# Patient Record
Sex: FEMALE | Race: WHITE
[De-identification: names, ages, dates, MRNs, and addresses within clinical notes are randomized per-mention and may not be internally consistent; named-entity substitution may affect disease eponyms.]

---

## 2021-07-19 NOTE — PCM.PNLD
Labor Progress Note





- VS & Meds


Vital Signs: 


                                Last Vital Signs











Temp  36.7 C   07/19/21 12:09


 


Pulse  86   07/19/21 12:09


 


Resp  16   07/19/21 12:09


 


BP  145/88 H  07/19/21 12:09


 


Pulse Ox  98   07/19/21 12:09











Active Medications: 


                               Current Medications





Acetaminophen (Acetaminophen 325 Mg Tab)  650 mg PO Q6H PRN


   PRN Reason: Pain (Mild 1-3) and fever


Diphenhydramine HCl (Diphenhydramine 50 Mg/Ml Sdv)  25 mg IVPUSH Q6H PRN


   PRN Reason: pruritis


Ephedrine Sulfate (Ephedrine 50 Mg/Ml Sdv)  5 mg IVPUSH ASDIRECTED PRN


   PRN Reason: Hypotension


Fentanyl (Fentanyl 100 Mcg/2 Ml Sdv)  100 mcg EPIDUR Q3H PRN


   PRN Reason: Pain


Fentanyl/Bupivacaine HCl (Bupivacaine/Fentanyl/Ns 100 Ml Bag)  100 ml EPIDUR 

ASDIRECTED PRN


   PRN Reason: Pain


Lactated Ringer's (Ringers, Lactated)  1,000 mls @ 100 mls/hr IV ASDIRECTED ROGER


   Last Admin: 07/19/21 17:24 Dose:  100 mls/hr


   Documented by: 


Oxytocin/Lactated Ringer's (Pitocin In Lr 10 Units/1,000 Ml)  10 unit in 1,000 

mls @ 100 mls/hr IV .CONTINUOUS ROGER


Oxytocin/Lactated Ringer's (Pitocin In Lr 10 Units/1,000 Ml)  10 unit in 1,000 

mls @ 12 mls/hr IV TITRATE ROGER; Protocol


   Last Admin: 07/19/21 17:38 Dose:  2 munits/min, 12 mls/hr


   Documented by: 


Misoprostol (Misoprostol 25 Mcg (1/4 Of 100 Mcg) Tab)  25 mcg VAG Q4H PRN


   PRN Reason: cervical ripening


   Last Admin: 07/19/21 13:29 Dose:  25 mcg


   Documented by: 


Nalbuphine HCl (Nalbuphine 10 Mg/1 Ml Vial)  10 mg IVPUSH Q2H PRN


   PRN Reason: Pain


Sodium Chloride (Sodium Chloride 0.9% 10 Ml Syringe)  10 ml FLUSH ASDIRECTED PRN


   PRN Reason: Keep Vein Open











- Uterine Contractions


Contraction Frequency (min): 3-7


Contraction Duration (sec): 60-75


Contraction Intensity: Mild to Moderate


Uterine Resting Tone: Soft





- Fetal Monitoring


Fetal Monitor Mode: Doppler/Auscultation


Fetal Heart Rate (FHR) Baseline: 125


Fetal Heart Rate (FHR) Variability: Moderate (6-25 bmp)


Fetal Accelerations: Present, 15x15


Fetal Decelerations: None


Fetal Strip Review: Category I





- Vaginal Exam


Dilation (cm): 3


Effacement (Percent): 80


Station: -2


Cervical Position: Midposition


Sterile Vaginal Exam Performed By: Nelson Jennings


Vaginal Exam Comment: The transcervical Flores bulb was able to be removed with 

gentle traction and cervical exam was performed after removal of the Flores bulb.





- Labor Progress (Free Text)


Labor Progress: 





Kelsey Jerry is a 25-year-old G1, P0 at 41 weeks 1 day undergoing induction 

of labor for late term pregnancy with new diagnosis of preeclampsia in pregnancy

given ongoing mild range blood pressures and elevated urine protein/creatinine 

ratio of 4.9








* Patient with previous transcervical Flores bulb in place that was able to be 

  removed at this time.  Cervix was 3/80/-2/soft/mid position after removal of 

  the Flores bulb.


* Given the dilation is fairly stable at 3 cm which is not significantly dilated

  at this time artificial rupture membranes was not performed


* Patient had received 1 dose of Cytotec with the placement of the Flores bulb


* Plan to transition to Pitocin with the cervix dilated to 3 cm after the Flores 

  bulb at this time


* Routine vitals at this time with close monitoring of her blood pressure for 

  any severe range blood pressures which may indicate severe features of 

  preeclampsia


* Patient denies any headache, vision changes or epigastric pain.  


* Patient has preeclampsia without severe features given the findings of ongoing

  mild range blood pressures with elevated urine protein/creatinine ratio of 

  4.9.  The remainder of her labs were within normal limits and were not showing

  any elevated values.


* Patient may have epidural if she desires


* Plan to recheck later this evening to see if she is having ongoing dilation of

  the cervix and we will plan for artificial rupture membranes at that time


* Anticipate vaginal delivery unless otherwise indicated








Nelson Jennings MD


5:49 PM


7/18/2021

## 2021-07-19 NOTE — PCM.PNLD
Labor Progress Note





- VS & Meds


Vital Signs: 


                                Last Vital Signs











Temp  36.7 C   07/19/21 12:09


 


Pulse  86   07/19/21 12:09


 


Resp  16   07/19/21 12:09


 


BP  145/88 H  07/19/21 12:09


 


Pulse Ox  98   07/19/21 12:09











Active Medications: 


                               Current Medications





Acetaminophen (Acetaminophen 325 Mg Tab)  650 mg PO Q6H PRN


   PRN Reason: Pain (Mild 1-3) and fever


Diphenhydramine HCl (Diphenhydramine 50 Mg/Ml Sdv)  25 mg IVPUSH Q6H PRN


   PRN Reason: pruritis


Ephedrine Sulfate (Ephedrine 50 Mg/Ml Sdv)  5 mg IVPUSH ASDIRECTED PRN


   PRN Reason: Hypotension


Fentanyl (Fentanyl 100 Mcg/2 Ml Sdv)  100 mcg EPIDUR Q3H PRN


   PRN Reason: Pain


Fentanyl/Bupivacaine HCl (Bupivacaine/Fentanyl/Ns 100 Ml Bag)  100 ml EPIDUR 

ASDIRECTED PRN


   PRN Reason: Pain


Lactated Ringer's (Ringers, Lactated)  1,000 mls @ 100 mls/hr IV ASDIRECTED ROGER


   Last Admin: 07/19/21 17:24 Dose:  100 mls/hr


   Documented by: 


Oxytocin/Lactated Ringer's (Pitocin In Lr 10 Units/1,000 Ml)  10 unit in 1,000 

mls @ 100 mls/hr IV .CONTINUOUS ROGER


Oxytocin/Lactated Ringer's (Pitocin In Lr 10 Units/1,000 Ml)  10 unit in 1,000 

mls @ 12 mls/hr IV TITRATE ROGER; Protocol


   Last Titration: 07/19/21 18:20 Dose:  4 munits/min, 24 mls/hr


   Documented by: 


Misoprostol (Misoprostol 25 Mcg (1/4 Of 100 Mcg) Tab)  25 mcg VAG Q4H PRN


   PRN Reason: cervical ripening


   Last Admin: 07/19/21 13:29 Dose:  25 mcg


   Documented by: 


Nalbuphine HCl (Nalbuphine 10 Mg/1 Ml Vial)  10 mg IVPUSH Q2H PRN


   PRN Reason: Pain


Sodium Chloride (Sodium Chloride 0.9% 10 Ml Syringe)  10 ml FLUSH ASDIRECTED PRN


   PRN Reason: Keep Vein Open





Discontinued Medications





Magnesium Sulfate (Magnesium Sulfate In Water 4 Gm/50 Ml) Confirm Administered 

Dose 50 mls @ as directed .ROUTE .STK-MED ONE


   Stop: 07/19/21 21:29


Magnesium Sulfate (Magnesium Sulfate In Water 40 Gm/1000 Ml) Confirm 

Administered Dose 40 gm in 1,000 mls @ as directed .ROUTE .STK-MED ONE


   Stop: 07/19/21 21:29


Labetalol HCl (Labetalol 100 Mg/20 Ml Mdv) Confirm Administered Dose 100 mg 

.ROUTE .STK-MED ONE


   Stop: 07/19/21 21:22


Ondansetron HCl (Ondansetron 4 Mg/2 Ml Sdv) Confirm Administered Dose 4 mg 

.ROUTE .STK-MED ONE


   Stop: 07/19/21 21:49











- Uterine Contractions


Contraction Frequency (min): 2-4


Contraction Duration (sec): 60-75


Contraction Intensity: Moderate to Strong


Uterine Resting Tone: Soft





- Fetal Monitoring


Fetal Monitor Mode: Doppler/Auscultation


Fetal Heart Rate (FHR) Baseline: 120


Fetal Heart Rate (FHR) Variability: Moderate (6-25 bmp)


Fetal Accelerations: Present, 15x15


Fetal Decelerations: Early, Variable (intermittent variable decelerations with 

contractions)


Fetal Strip Review: Category II





- Vaginal Exam


Dilation (cm): 4


Effacement (Percent): 90


Station: -2


Cervical Position: Midposition


Sterile Vaginal Exam Performed By: Nelson Jennings


Vaginal Exam Comment: Artificial rupture membranes performed with return of 

moderate amount of clear fluid.  Mother and infant tolerated procedure without 

difficulty.





- Labor Progress (Free Text)


Labor Progress: 





Kelsey Jerry is a 25-year-old G1, P0 at 41 weeks 1 day undergoing induction 

of labor for late term pregnancy with new diagnosis of preeclampsia with severe 

features with new onset sustained severe range blood pressures with treatment 

with labetalol x2 doses








* Patient with artificial rupture membranes with return of moderate amount of 

  clear fluid.  Mother and infant tolerated procedure without difficulty


* Patient with sustained severe range blood pressures and was treated with 

  labetalol 20 mg IV and then 40 mg IV.


* Start on magnesium sulfate IV with a 4 g bolus then 2 g/h afterwards with plan

  to continue for 24 hours after delivery


* Continue with close monitoring of vitals vitals to ensure that she is not 

  having any severe range pressures which would indicate additional need for 

  medical therapy


* Patient denies any headache, vision changes or epigastric pain.  


* Continue Pitocin for augmentation of labor


* Patient may have epidural if she desires


* Anticipate vaginal delivery unless otherwise indicated








Nelson Jennings MD


10:06 PM


7/18/2021

## 2021-07-19 NOTE — PCM.PREANE
Preanesthetic Assessment





- Procedure


Proposed Procedure: 





Labor epidural





- Anesthesia/Transfusion/Family Hx


Anesthesia History: Prior Anesthesia Without Reaction


Family History of Anesthesia Reaction: No


Transfusion History: No Prior Transfusion(s)


Intubation History: Unknown





- Review of Systems


General: No Symptoms


Pulmonary: No Symptoms


Cardiovascular: Edema (Lower extremities)


Gastrointestinal: Abdominal Pain (uterine contractions)


Neurological: Tingling (Tingling to pointer, middle, and ring finger tips that 

occurred during pregnancy)





- Physical Assessment


NPO Status Date: 07/19/21


NPO Status Time: 14:10


Vital Signs: 





                                Last Vital Signs











Temp  98.0 F   07/19/21 12:09


 


Pulse  86   07/19/21 12:09


 


Resp  16   07/19/21 12:09


 


BP  145/88 H  07/19/21 12:09


 


Pulse Ox  98   07/19/21 12:09











Height: 1.68 m


Weight: 117.934 kg


ASA Class: 2


Mental Status: Alert & Oriented x3


Airway Class: Mallampati = 2


Dentition: Reports: Normal Dentition


Thyro-Mental Finger Breadths: 3


Mouth Opening Finger Breadths: 3


Lungs: Clear to Auscultation, Normal Respiratory Effort


Cardiovascular: Regular Rate, Regular Rhythm





- Lab


Values: 





                             Laboratory Last Values











WBC  9.06 K/mm3 (3.98-10.04)   07/19/21  13:19    


 


RBC  3.62 M/mm3 (3.98-5.22)  L  07/19/21  13:19    


 


Hgb  12.1 gm/dl (11.2-15.7)   07/19/21  13:19    


 


Hct  36.4 % (34.1-44.9)   07/19/21  13:19    


 


MCV  100.6 fl (79.4-94.8)  H  07/19/21  13:19    


 


MCH  33.4 pg (25.6-32.2)  H  07/19/21  13:19    


 


MCHC  33.2 g/dl (32.2-35.5)   07/19/21  13:19    


 


RDW Std Deviation  45.9 fL (36.4-46.3)   07/19/21  13:19    


 


Plt Count  234 K/mm3 (182-369)   07/19/21  13:19    


 


MPV  11.4 fl (9.4-12.3)   07/19/21  13:19    


 


Neut % (Auto)  65.6 % (34.0-71.1)   07/19/21  13:19    


 


Lymph % (Auto)  21.1 % (19.3-51.7)   07/19/21  13:19    


 


Mono % (Auto)  11.7 % (4.7-12.5)   07/19/21  13:19    


 


Eos % (Auto)  0.9  (0.7-5.8)   07/19/21  13:19    


 


Baso % (Auto)  0.3 % (0.1-1.2)   07/19/21  13:19    


 


Neut # (Auto)  5.94 K/mm3 (1.56-6.13)   07/19/21  13:19    


 


Lymph # (Auto)  1.91 K/mm3 (1.18-3.74)   07/19/21  13:19    


 


Mono # (Auto)  1.06 K/mm3 (0.24-0.36)  H  07/19/21  13:19    


 


Eos # (Auto)  0.08 K/mm3 (0.04-0.36)   07/19/21  13:19    


 


Baso # (Auto)  0.03 K/mm3 (0.01-0.08)   07/19/21  13:19    


 


Sodium  140 mEq/L (136-145)   07/19/21  13:19    


 


Potassium  4.5 mEq/L (3.5-5.1)   07/19/21  13:19    


 


Chloride  106 mEq/L ()   07/19/21  13:19    


 


Carbon Dioxide  25 mEq/L (21-32)   07/19/21  13:19    


 


Anion Gap  13.5  (5-15)   07/19/21  13:19    


 


BUN  10 mg/dL (7-18)   07/19/21  13:19    


 


Creatinine  0.9 mg/dL (0.55-1.02)   07/19/21  13:19    


 


Est Cr Clr Drug Dosing  88.68 mL/min  07/19/21  13:19    


 


Estimated GFR (MDRD)  > 60 mL/min (>60)   07/19/21  13:19    


 


BUN/Creatinine Ratio  11.1  (14-18)  L  07/19/21  13:19    


 


Glucose  74 mg/dL (70-99)   07/19/21  13:19    


 


Calcium  8.6 mg/dL (8.5-10.1)   07/19/21  13:19    


 


Total Bilirubin  0.3 mg/dL (0.2-1.0)   07/19/21  13:19    


 


AST  21 U/L (15-37)   07/19/21  13:19    


 


ALT  15 U/L (14-59)   07/19/21  13:19    


 


Alkaline Phosphatase  217 U/L ()  H  07/19/21  13:19    


 


Total Protein  5.9 g/dl (6.4-8.2)  L  07/19/21  13:19    


 


Albumin  2.2 g/dl (3.4-5.0)  L  07/19/21  13:19    


 


Globulin  3.7 gm/dL  07/19/21  13:19    


 


Albumin/Globulin Ratio  0.6  (1-2)  L  07/19/21  13:19    








Labs reviewed and okay to proceed





- Allergies


Allergies/Adverse Reactions: 


                                    Allergies











Allergy/AdvReac Type Severity Reaction Status Date / Time


 


Sulfa (Sulfonamide Allergy  Cannot Verified 12/13/16 13:46





Antibiotics)   Remember  














- Blood


Product(s) Available: None





- Acknowledgements


Anesthesia Type Planned: Epidural


Pt an Appropriate Candidate for the Planned Anesthesia: Yes


Alternatives and Risks of Anesthesia Discussed w Pt/Guardian: Yes


Pt/Guardian Understands and Agrees with Anesthesia Plan: Yes





PreAnesthesia Questionnaire





- Past Health History


Medical/Surgical History: Denies Medical/Surgical History


Gastrointestinal History: Reports: GERD


OB/GYN History: Reports: Pregnancy


Other Musculoskeletal History: History of shoulder contusion





- Past Surgical History


HEENT Surgical History: Reports: Oral Surgery, Other (See Below) ([place] teeth 

extraction)





- SUBSTANCE USE


Tobacco Use Status *Q: Never Tobacco User


Tobacco Use Within Last Twelve Months: No


Second Hand Smoke Exposure: No


Recreational Drug Use History: No





- HOME MEDS


Home Medications: 


                                    Home Meds





Acetaminophen/oxyCODONE [Percocet 325-5 MG] 1 tab PO Q6H PRN #10 tablet 12/13/16

[Rx]


Pnv No.95/Ferrous Fum/Folic AC [Prenatal Caplet] 1 tab-cap PO DAILY 07/19/21 

[History]











- CURRENT (IN HOUSE) MEDS


Current Meds: 





                               Current Medications





Acetaminophen (Acetaminophen 325 Mg Tab)  650 mg PO Q6H PRN


   PRN Reason: Pain (Mild 1-3) and fever


Lactated Ringer's (Ringers, Lactated)  1,000 mls @ 100 mls/hr IV ASDIRECTED ROGER


Oxytocin/Lactated Ringer's (Pitocin In Lr 10 Units/1,000 Ml)  10 unit in 1,000 

mls @ 100 mls/hr IV .CONTINUOUS ROGER


Misoprostol (Misoprostol 25 Mcg (1/4 Of 100 Mcg) Tab)  25 mcg VAG Q4H PRN


   PRN Reason: cervical ripening


   Last Admin: 07/19/21 13:29 Dose:  25 mcg


   Documented by: 


Nalbuphine HCl (Nalbuphine 10 Mg/1 Ml Vial)  10 mg IVPUSH Q2H PRN


   PRN Reason: Pain


Sodium Chloride (Sodium Chloride 0.9% 10 Ml Syringe)  10 ml FLUSH ASDIRECTED PRN


   PRN Reason: Keep Vein Open

## 2021-07-19 NOTE — PCM.LDHP
L&D History of Present Illness





- General


Date of Service: 21


Admit Problem/Dx: 


                   Patient Status Order with Admit Dx/Problem





21 13:00


Patient Status [ADT] Routine 








                           Admission Diagnosis/Problem











Admission Diagnosis/Problem    41 weeks gestation of pregnancy














Source of Information: Patient


History Limitations: Reports: No Limitations





- History of Present Illness


Introduction:: 





Kelsey Jerry is a 25-year-old G1, P0 female at 41 weeks 1 day (JAQUELIN 

2021) by a 5-week ultrasound who presents for induction of labor in the se

tting of late term pregnancy.  Patient had been checked in the office on her 

appointment on 2021 and had membrane stripping performed at that time 

without any significant contractions since having the membranes stripped.  She 

states that she had been having some intermittent low back pain that was not 

consistent or regular.  She denies any leaking of fluid or vaginal bleeding.  

Reports that she did have some mucus discharge.  She reports good fetal 

movement.


Location, Pregnancy: Reports: Lower back


Quality: Reports: Pressure, Throbbing


Severity: Mild


Associated Symptoms: Denies: vaginal bleeding, vaginal discharge, vaginal fluid


Present Illness Comments:: 





Kelsey Jerry is a 25-year-old G1, P0 female at 41 weeks 1 day (JAQUELIN 

2021) by a 5-week ultrasound who presents for induction of labor in the 

setting of late term pregnancy.  She has had routine prenatal care with Dr. Reed starting at 5 weeks gestational age.  She denies any complications 

during this pregnancy.  She received Tdap vaccine on 2021.  She received 

the influenza vaccine on 10/27/2020.  She has not had any concerns throughout 

the pregnancy.








Her pregnancy is complicated by:


* Obesity in pregnancy with BMI of 42








OB/GYN history


: Current pregnancy








Prenatal labs


Blood type: O+


Antibody screen: Negative


First trimester hematocrit/hemoglobin: 37.6%/12.8 on 2021


Platelets: 265 on 2021


Urine culture: Negative


Rubella status: Immune


Hepatitis B surface antigen: Negative


RPR: Negative


Hepatitis C: Negative


HIV: Negative


Gonorrhea: Negative


Chlamydia: Negative


One hour glucose tolerance test: 100


Second trimester hematocrit/hemoglobin: 37.0%/12.5 on 3/23/2021


Platelets: 287 on 3/23/2021


GBS status: Negative





- Related Data


Allergies/Adverse Reactions: 


                                    Allergies











Allergy/AdvReac Type Severity Reaction Status Date / Time


 


Sulfa (Sulfonamide Allergy  Cannot Verified 16 13:46





Antibiotics)   Remember  











Home Medications: 


                                    Home Meds





Acetaminophen/oxyCODONE [Percocet 325-5 MG] 1 tab PO Q6H PRN #10 tablet 16

[Rx]


Pnv No.95/Ferrous Fum/Folic AC [Prenatal Caplet] 1 tab-cap PO DAILY 21 

[History]











Past Medical History





- Past Health History


Medical/Surgical History: Denies Medical/Surgical History





- Past Surgical History


HEENT Surgical History: Reports: Oral Surgery, Other (See Below) ([place] teeth 

extraction)





Social & Family History





- Tobacco Use


Tobacco Use Status *Q: Never Tobacco User


Tobacco Use Within Last Twelve Months: No





- Tobacco Core Measures


Tobacco Use/Smoking Within Last 30 Days: No


Smokeless Tobacco Use in Last 30 Days: No





- Caffeine Use


Caffeine Use: Reports: None





- Alcohol Use


Alcohol Use History: No


Alcohol Use in Last Twelve Months: No





- Recreational Drug Use


Recreational Drug Use: No


Drug Use in Last 12 Months: No





- Living Situation & Occupation


Living situation: Reports: , with Spouse





H&P Review of Systems





- Review of Systems:


Review Of Systems: See Below


General: Denies: Fever, Chills, Malaise, Weakness, Fatigue


HEENT: Reports: Glasses.  Denies: Headaches, Rhinitis, Post Nasal Drip, Sinus 

Congestion, Sore Throat, Visual Changes


Pulmonary: Denies: Shortness of Breath, Wheezing, Pleuritic Chest Pain, Cough


Cardiovascular: Denies: Chest Pain, Palpitations, Dyspnea on Exertion, Orthopnea


Gastrointestinal: Denies: Abdominal Pain, Constipation, Diarrhea, Nausea, 

Vomiting


Genitourinary: Denies: Dysuria, Frequency, Burning, Pain, Urgency


Musculoskeletal: Reports: Back Pain


Skin: Denies: Rash, Lumps


Psychiatric: Denies: Depression, Anxiety





L&D Exam





- Exam


Exam: See Below





- Vital Signs


Vital Signs: 


                                Last Vital Signs











Temp  36.7 C   21 12:09


 


Pulse  86   21 12:09


 


Resp  16   21 12:09


 


BP  145/88 H  21 12:09


 


Pulse Ox  98   21 12:09











Weight: 117.934 kg





- OB Specific


Contraction Duration (sec): 60-75


Contraction Frequency (min): 4-7


Contraction Intensity: Mild


Fetal Movement: Active


Fetal Heart Tones: Present


Fetal Heart Tones per Min: 130 (+15 x 15 accelerations, no decelerations)


Fetal Heart Rate (FHR) Variability: Moderate (6-25 bmp)


Birth Presentation: Vertex


Estimated Fetal Weight: 8-8.5 pounds by Leopold





- Dave Score


Dave Score Cervix Position: Posterior


Dave Score Consistency: Soft


Dave Score Effacement: >80% (80%)


Dave Score Dilation: 1-2 cm (2 cm)


Dave Score Infant's Station: -3


Dave Score Total: 6





- Exam


General: Alert, Oriented


HEENT: Conjunctiva Clear, EOMI


Neck: Supple, Trachea Midline


Lungs: Clear to Auscultation, Normal Respiratory Effort


Cardiovascular: Regular Rate, Regular Rhythm


GI/Abdominal Exam: Soft, Non-Tender, No Distention, Other.  No: Guarding, Rigid,

 Rebound


Genitourinary: Normal external exam, Cervical dilitation (1 cm by visual 

inspection), Other (A 16 Swedish Flores catheter was placed using direct 

visualization with a speculum and the catheter was filled with 40 mL of sterile 

saline.  Mother and infant tolerated procedure without difficulty.)


Skin: Warm, Dry, Intact


Psychiatric: Alert, Normal Affect, Normal Mood





- Problem List


(1) 41 weeks gestation of pregnancy


SNOMED Code(s): 73449586


   ICD Code: Z3A.41 - 41 WEEKS GESTATION OF PREGNANCY   Status: Acute   Current 

Visit: Yes   





(2) Obesity affecting pregnancy


SNOMED Code(s): 712999519088, 245228722617


   ICD Code: O99.210 - OBESITY COMPLICATING PREGNANCY, UNSPECIFIED TRIMESTER   

Status: Acute   Current Visit: Yes   


Problem List Initiated/Reviewed/Updated: Yes


Orders Last 24hrs: 


                               Active Orders 24 hr











 Category Date Time Status


 


 Patient Status [ADT] Routine ADT  21 13:00 Ordered


 


 Activity as Tolerated [RC] PFP Care  21 13:00 Ordered


 


 Communication Order [RC] ASDIRECTED Care  21 13:00 Ordered


 


 Communication Order [RC] ASDIRECTED Care  21 13:00 Ordered


 


 Communication Order [RC] ASDIRECTED Care  21 13:00 Ordered


 


 Communication Order [RC] ASDIRECTED Care  21 13:00 Ordered


 


 Fetal Heart Tones [RC] ASDIRECTED Care  21 13:01 Ordered


 


 Fetal Monitoring [RC] INTERMITTENT Care  21 13:00 Ordered


 


 Fetal Non Stress Test [RC] PER UNIT ROUTINE Care  21 13:00 Ordered


 


 Notify Provider Vital Signs [RC] PRN Care  21 13:02 Ordered


 


 Notify Provider [RC] ASDIRECTED Care  21 13:00 Ordered


 


 Notify Provider [RC] ASDIRECTED Care  21 13:04 Ordered


 


 Notify Provider [RC] PFP Care  21 13:00 Ordered


 


 Notify Provider [RC] PRN Care  21 13:00 Ordered


 


 Peripheral IV Care [RC] .AS DIRECTED Care  21 13:01 Ordered


 


 Pump Management, Intrathecal [RC] ASDIRECTED Care  21 13:01 Ordered


 


 Urinary Catheter Assessment [RC] ASDIRECTED Care  21 12:59 Ordered


 


 Vaginal Exam [RC] ASDIRECTED Care  21 13:00 Ordered


 


 Vital Signs [RC] ASDIRECTED Care  21 13:00 Ordered


 


 Vital Signs [RC] PER UNIT ROUTINE Care  21 13:00 Ordered


 


 Regular Diet [DIET] Diet  21 Lunch Ordered


 


 CBC WITH AUTO DIFF [HEME] Routine Lab  21 12:59 Ordered


 


 COMPREHENSIVE METABOLIC PN,CMP [CHEM] Routine Lab  21 12:59 Ordered


 


 PROTEIN/CREATININE RATIO,URINE [URCHEM] Routine Lab  21 12:59 Ordered


 


 RAPID PLASMA REAGIN,RPR [CHEM] Routine Lab  21 13:00 Ordered


 


 TYPE AND SCREEN [BBK] Routine Lab  21 12:59 Ordered


 


 Acetaminophen [TylenoL] Med  21 12:59 Ordered





 650 mg PO Q6H PRN   


 


 Lactated Ringers [Ringers, Lactated] 1,000 ml Med  21 13:00 Ordered





 IV ASDIRECTED   


 


 Nalbuphine [Nubain] Med  21 12:59 Ordered





 10 mg IVPUSH Q2H PRN   


 


 Oxytocin/Lactated Ringers [Pitocin in LR 10 Units/1,000 Med  21 13:00 

Ordered





 ML]   





 10 unit in 1,000 ml IV .CONTINUOUS   


 


 Sodium Chloride 0.9% [Saline Flush] Med  21 12:59 Ordered





 10 ml FLUSH ASDIRECTED PRN   


 


 miSOPROStoL [Cytotec] Med  21 12:59 Ordered





 25 mcg VAG Q4H PRN   


 


 Electronic Fetal Heart Tones Ext w TOCO [WOMSER] Oth  21 13:00 Ordered





 Routine   


 


 Electronic Fetal Heart Tones Internal [WOMSER] Per Unit Oth  21 13:00 

Ordered





 Routine   


 


 Peripheral IV Insertion Adult [OM.PC] Routine Oth  21 13:00 Ordered


 


 Resuscitation Status Routine Resus Stat  21 12:59 Ordered











Assessment/Plan Comment:: 





Kelsey Jerry is a 25-year-old G1, P0 female at 41 weeks 1 day (JAQUELIN 

2021) undergoing induction of labor in the setting of late term pregnancy











* Refer to observation for elective induction of labor


* Patient had placement of a 16 Swedish Flores bulb catheter with direct 

  visualization and the Flores bulb was filled with 40 mL of sterile saline.  

  Mother and infant tolerated procedure without difficulty.


* Start induction of labor with Cytotec 25 mcg vaginally now and every 4 hours


* Intermittent monitoring while on Cytotec with monitoring for 30 minutes after 

  placement of Cytotec and may ambulate as tolerated with category 1 monitoring


* Place IV and have Lactated Ringer's at 125 ml/hr if not tolerating regular 

  diet


* May have regular diet while on Cytotec induction


* Activity as tolerated


* May have epidural as desired


* Plans to breast-feed after delivery


* Patient with 1 mild range blood pressure with a value of 145/88 and we will 

  get labs including CBC, CMP, LDH and urine protein/creatinine ratio.  We will 

  continue to monitor blood pressures closely throughout the induction to ensure

   that there is not any evidence of gestational hypertension or preeclampsia


* Anticipate vaginal delivery unless otherwise indicated








Nelson Jennings MD


1:17 PM


2021

## 2021-07-20 NOTE — PCM.SN.2
- Free Text/Narrative


Note: 





S- Feeling well. Some pain. No complaints. 


    No headaches or visual changes





O- t- 100.1, Tmax - 100.1. Blood pressures 128/85


    Fundus above umbilicus with bakri in place.


    Output from bakri 50 mL





A - Doing well, minimal output from bakri


     CBC minimal drop after significant hemorrhage and 2 uPRBC. Will recheck in 

morning





     Severe pre-e - magnesium discontinued during acute hemorrhage. Given risk 

of bleeding and elevated creatinine will leave magnesium off.


                          hold ibuprofen for now


                          UOP increasing and clearing. Watch output closely. 

Repeat labs in the am.

## 2021-07-21 NOTE — PCM.PNPP
- General Info


Date of Service: 21


Subjective Update: 





PPD1 after  on magnesium for pre-ecclampsia and postpartum hemorrhage with 

TXA, cytotec, bakri balloon and 2 units pRBCs. Up with assist from nurse over 

night and fairly tired and unsteady. 





Pain controlled. 


Functional Status: Reports: Pain Controlled





- Review of Systems


General: Reports: No Symptoms


HEENT: Reports: No Symptoms


Pulmonary: Reports: No Symptoms


Cardiovascular: Reports: No Symptoms


Gastrointestinal: Reports: No Symptoms


Genitourinary: Reports: No Symptoms


Musculoskeletal: Reports: No Symptoms


Skin: Reports: No Symptoms


Neurological: Reports: No Symptoms


Psychiatric: Reports: No Symptoms





- General Info


Date of Service: 21





- Patient Data


Vital Signs - Most Recent: 


                                Last Vital Signs











Temp  36.7 C   21 06:17


 


Pulse  80   21 06:17


 


Resp  16   21 06:17


 


BP  134/77   21 06:17


 


Pulse Ox  98   21 06:17











Weight - Most Recent: 117.934 kg


I&O - Last 24 Hours: 


                                 Intake & Output











 21





 22:59 06:59 14:59


 


Intake Total 0  


 


Output Total 847 1470 


 


Balance -847 -1470 











Lab Results - Last 24 Hours: 


                         Laboratory Results - last 24 hr











  21 Range/Units





  13:19 12:35 12:35 


 


WBC   14.36 H   (3.98-10.04)  K/mm3


 


RBC   3.33 L   (3.98-5.22)  M/mm3


 


Hgb   11.1 L   (11.2-15.7)  gm/dl


 


Hct   33.5 L   (34.1-44.9)  %


 


MCV   100.6 H   (79.4-94.8)  fl


 


MCH   33.3 H   (25.6-32.2)  pg


 


MCHC   33.1   (32.2-35.5)  g/dl


 


RDW Std Deviation   45.5   (36.4-46.3)  fL


 


Plt Count   219   (182-369)  K/mm3


 


MPV   10.9   (9.4-12.3)  fl


 


Neut % (Auto)     (34.0-71.1)  %


 


Lymph % (Auto)     (19.3-51.7)  %


 


Mono % (Auto)     (4.7-12.5)  %


 


Eos % (Auto)     (0.7-5.8)  


 


Baso % (Auto)     (0.1-1.2)  %


 


Neut # (Auto)     (1.56-6.13)  K/mm3


 


Lymph # (Auto)     (1.18-3.74)  K/mm3


 


Mono # (Auto)     (0.24-0.36)  K/mm3


 


Eos # (Auto)     (0.04-0.36)  K/mm3


 


Baso # (Auto)     (0.01-0.08)  K/mm3


 


Manual Slide Review     


 


PT    9.3 L  (9.7-12.0)  SECONDS


 


INR    < 0.93  


 


APTT    21.9  (21.7-31.4)  SECONDS


 


Fibrinogen    436  (187-446)  mg/dL


 


Sodium     (136-145)  mEq/L


 


Potassium     (3.5-5.1)  mEq/L


 


Chloride     ()  mEq/L


 


Carbon Dioxide     (21-32)  mEq/L


 


Anion Gap     (5-15)  


 


BUN     (7-18)  mg/dL


 


Creatinine     (0.55-1.02)  mg/dL


 


Est Cr Clr Drug Dosing     mL/min


 


Estimated GFR (MDRD)     (>60)  mL/min


 


BUN/Creatinine Ratio     (14-18)  


 


Glucose     (70-99)  mg/dL


 


Calcium     (8.5-10.1)  mg/dL


 


Total Bilirubin     (0.2-1.0)  mg/dL


 


AST     (15-37)  U/L


 


ALT     (14-59)  U/L


 


Alkaline Phosphatase     ()  U/L


 


Total Protein     (6.4-8.2)  g/dl


 


Albumin     (3.4-5.0)  g/dl


 


Globulin     gm/dL


 


Albumin/Globulin Ratio     (1-2)  


 


Crossmatch  See Detail    














  21 Range/Units





  12:35 16:10 16:10 


 


WBC   29.21 H   (3.98-10.04)  K/mm3


 


RBC   3.73 L   (3.98-5.22)  M/mm3


 


Hgb   11.9   (11.2-15.7)  gm/dl


 


Hct   35.2   (34.1-44.9)  %


 


MCV   94.4  D   (79.4-94.8)  fl


 


MCH   31.9   (25.6-32.2)  pg


 


MCHC   33.8   (32.2-35.5)  g/dl


 


RDW Std Deviation   54.0 H   (36.4-46.3)  fL


 


Plt Count   223   (182-369)  K/mm3


 


MPV   10.9   (9.4-12.3)  fl


 


Neut % (Auto)   87.7 H   (34.0-71.1)  %


 


Lymph % (Auto)   4.5 L   (19.3-51.7)  %


 


Mono % (Auto)   7.4   (4.7-12.5)  %


 


Eos % (Auto)   0 L   (0.7-5.8)  


 


Baso % (Auto)   0.1   (0.1-1.2)  %


 


Neut # (Auto)   25.64 H   (1.56-6.13)  K/mm3


 


Lymph # (Auto)   1.31   (1.18-3.74)  K/mm3


 


Mono # (Auto)   2.16 H   (0.24-0.36)  K/mm3


 


Eos # (Auto)   0.00 L   (0.04-0.36)  K/mm3


 


Baso # (Auto)   0.02   (0.01-0.08)  K/mm3


 


Manual Slide Review   Abnormal smear   


 


PT     (9.7-12.0)  SECONDS


 


INR     


 


APTT     (21.7-31.4)  SECONDS


 


Fibrinogen     (187-446)  mg/dL


 


Sodium    131 L  (136-145)  mEq/L


 


Potassium    4.8  (3.5-5.1)  mEq/L


 


Chloride    100  ()  mEq/L


 


Carbon Dioxide    21  (21-32)  mEq/L


 


Anion Gap    14.8  (5-15)  


 


BUN    12  (7-18)  mg/dL


 


Creatinine    1.2 H  (0.55-1.02)  mg/dL


 


Est Cr Clr Drug Dosing    66.51  mL/min


 


Estimated GFR (MDRD)    54  (>60)  mL/min


 


BUN/Creatinine Ratio    10.0 L  (14-18)  


 


Glucose    122 H  (70-99)  mg/dL


 


Calcium    7.9 L  (8.5-10.1)  mg/dL


 


Total Bilirubin    0.6  (0.2-1.0)  mg/dL


 


AST    30  (15-37)  U/L


 


ALT    13 L  (14-59)  U/L


 


Alkaline Phosphatase    178 H  ()  U/L


 


Total Protein    5.0 L  (6.4-8.2)  g/dl


 


Albumin    1.8 L  (3.4-5.0)  g/dl


 


Globulin    3.2  gm/dL


 


Albumin/Globulin Ratio    0.6 L  (1-2)  


 


Crossmatch  See Detail    














  21 Range/Units





  18:54 19:20 08:36 


 


WBC   27.19 H  19.41 H  (3.98-10.04)  K/mm3


 


RBC   3.46 L  3.02 L  (3.98-5.22)  M/mm3


 


Hgb   10.9 L  9.4 L D  (11.2-15.7)  gm/dl


 


Hct   32.2 L  28.7 L  (34.1-44.9)  %


 


MCV   93.1  95.0 H  (79.4-94.8)  fl


 


MCH   31.5  31.1  (25.6-32.2)  pg


 


MCHC   33.9  32.8  (32.2-35.5)  g/dl


 


RDW Std Deviation   55.3 H  58.5 H  (36.4-46.3)  fL


 


Plt Count   213  179 L  (182-369)  K/mm3


 


MPV   10.8  10.5  (9.4-12.3)  fl


 


Neut % (Auto)   84.2 H   (34.0-71.1)  %


 


Lymph % (Auto)   8.6 L   (19.3-51.7)  %


 


Mono % (Auto)   6.8   (4.7-12.5)  %


 


Eos % (Auto)   0 L   (0.7-5.8)  


 


Baso % (Auto)   0.1   (0.1-1.2)  %


 


Neut # (Auto)   22.89 H   (1.56-6.13)  K/mm3


 


Lymph # (Auto)   2.33   (1.18-3.74)  K/mm3


 


Mono # (Auto)   1.85 H   (0.24-0.36)  K/mm3


 


Eos # (Auto)   0.00 L   (0.04-0.36)  K/mm3


 


Baso # (Auto)   0.03   (0.01-0.08)  K/mm3


 


Manual Slide Review   Abnormal smear   


 


PT     (9.7-12.0)  SECONDS


 


INR     


 


APTT     (21.7-31.4)  SECONDS


 


Fibrinogen     (187-446)  mg/dL


 


Sodium  130 L    (136-145)  mEq/L


 


Potassium  4.6    (3.5-5.1)  mEq/L


 


Chloride  100    ()  mEq/L


 


Carbon Dioxide  20 L    (21-32)  mEq/L


 


Anion Gap  14.6    (5-15)  


 


BUN  12    (7-18)  mg/dL


 


Creatinine  1.2 H    (0.55-1.02)  mg/dL


 


Est Cr Clr Drug Dosing  66.51    mL/min


 


Estimated GFR (MDRD)  54    (>60)  mL/min


 


BUN/Creatinine Ratio  10.0 L    (14-18)  


 


Glucose  131 H    (70-99)  mg/dL


 


Calcium  7.9 L    (8.5-10.1)  mg/dL


 


Total Bilirubin  0.4    (0.2-1.0)  mg/dL


 


AST  34    (15-37)  U/L


 


ALT  15    (14-59)  U/L


 


Alkaline Phosphatase  159 H    ()  U/L


 


Total Protein  4.7 L    (6.4-8.2)  g/dl


 


Albumin  1.7 L    (3.4-5.0)  g/dl


 


Globulin  3.0    gm/dL


 


Albumin/Globulin Ratio  0.6 L    (1-2)  


 


Crossmatch     














  21 Range/Units





  08:36 


 


WBC   (3.98-10.04)  K/mm3


 


RBC   (3.98-5.22)  M/mm3


 


Hgb   (11.2-15.7)  gm/dl


 


Hct   (34.1-44.9)  %


 


MCV   (79.4-94.8)  fl


 


MCH   (25.6-32.2)  pg


 


MCHC   (32.2-35.5)  g/dl


 


RDW Std Deviation   (36.4-46.3)  fL


 


Plt Count   (182-369)  K/mm3


 


MPV   (9.4-12.3)  fl


 


Neut % (Auto)   (34.0-71.1)  %


 


Lymph % (Auto)   (19.3-51.7)  %


 


Mono % (Auto)   (4.7-12.5)  %


 


Eos % (Auto)   (0.7-5.8)  


 


Baso % (Auto)   (0.1-1.2)  %


 


Neut # (Auto)   (1.56-6.13)  K/mm3


 


Lymph # (Auto)   (1.18-3.74)  K/mm3


 


Mono # (Auto)   (0.24-0.36)  K/mm3


 


Eos # (Auto)   (0.04-0.36)  K/mm3


 


Baso # (Auto)   (0.01-0.08)  K/mm3


 


Manual Slide Review   


 


PT   (9.7-12.0)  SECONDS


 


INR   


 


APTT   (21.7-31.4)  SECONDS


 


Fibrinogen   (187-446)  mg/dL


 


Sodium  137  (136-145)  mEq/L


 


Potassium  4.5  (3.5-5.1)  mEq/L


 


Chloride  104  ()  mEq/L


 


Carbon Dioxide  25  (21-32)  mEq/L


 


Anion Gap  12.5  (5-15)  


 


BUN  12  (7-18)  mg/dL


 


Creatinine  0.8  (0.55-1.02)  mg/dL


 


Est Cr Clr Drug Dosing  99.76  mL/min


 


Estimated GFR (MDRD)  > 60  (>60)  mL/min


 


BUN/Creatinine Ratio  15.0  (14-18)  


 


Glucose  82  (70-99)  mg/dL


 


Calcium  8.0 L  (8.5-10.1)  mg/dL


 


Total Bilirubin  0.2  (0.2-1.0)  mg/dL


 


AST  30  (15-37)  U/L


 


ALT  10 L  (14-59)  U/L


 


Alkaline Phosphatase  135 H  ()  U/L


 


Total Protein  4.7 L  (6.4-8.2)  g/dl


 


Albumin  1.6 L  (3.4-5.0)  g/dl


 


Globulin  3.1  gm/dL


 


Albumin/Globulin Ratio  0.5 L  (1-2)  


 


Crossmatch   











Med Orders - Current: 


                               Current Medications





Acetaminophen (Acetaminophen 325 Mg Tab)  650 mg PO Q6H PRN


   PRN Reason: Breakthrough Pain


   Last Admin: 21 02:34 Dose:  650 mg


   Documented by: 


Benzocaine/Menthol (Benzocaine/Menthol 20%-0.5% Spray 56 Gm Canister)  0 gm TOP 

ASDIRECTED PRN


   PRN Reason: Perineal Comfort Measure


   Last Admin: 21 16:59 Dose:  1 ea


   Documented by: 


Oxycodone/Acetaminophen (Acetaminophen/Oxycodone 325-5 Mg Tab)  1 tab PO Q6H PRN


   PRN Reason: Pain


Witch Hazel (Witch Hazel Medicated Pads 40/Jar)  1 pad TOP ASDIRECTED PRN


   PRN Reason: Perineal Comfort Measure


   Last Admin: 21 16:59 Dose:  1 ea


   Documented by: 





Discontinued Medications





Acetaminophen (Acetaminophen 325 Mg Tab)  650 mg PO Q6H PRN


   PRN Reason: Pain (Mild 1-3) and fever


   Last Admin: 21 14:37 Dose:  650 mg


   Documented by: 


Bupivacaine HCl (Bupivacaine 0.25% 10 Ml Sdv)  10 ml .ROUTE .STK-MED ONE


   Stop: 21 16:01


Calcium Carbonate/Glycine (Calcium Carbonate 500 Mg Tab.Chew)  1,000 mg PO Q2HR 

PRN


   PRN Reason: Indigestion


   Last Admin: 21 04:11 Dose:  1,000 mg


   Documented by: 


Calcium Gluconate (Calcium Gluconate 10% 1 Gm/10 Ml Sdv)  1 gm IV ASDIRECTED PRN


   PRN Reason: respiratory distress


Dexmedetomidine HCl (Dexmedetomidine 200 Mcg/2 Ml Sdv) Confirm Administered Dose

200 mcg .ROUTE .STK-MED ONE


   Stop: 21 07:19


Diphenhydramine HCl (Diphenhydramine 50 Mg/Ml Sdv)  25 mg IVPUSH Q6H PRN


   PRN Reason: pruritis


Ephedrine Sulfate (Ephedrine 50 Mg/Ml Sdv)  5 mg IVPUSH ASDIRECTED PRN


   PRN Reason: Hypotension


Fentanyl (Fentanyl 100 Mcg/2 Ml Sdv)  100 mcg EPIDUR Q3H PRN


   PRN Reason: Pain


   Last Admin: 21 22:01 Dose:  100 mcg


   Documented by: 


Fentanyl (Fentanyl 100 Mcg/2 Ml Sdv) Confirm Administered Dose 100 mcg .ROUTE 

.STK-MED ONE


   Stop: 21 07:23


Fentanyl/Bupivacaine HCl (Bupivacaine/Fentanyl/Ns 100 Ml Bag)  100 ml EPIDUR 

ASDIRECTED PRN


   PRN Reason: Pain


   Last Admin: 21 05:12 Dose:  100 ml


   Documented by: 


Lactated Ringer's (Ringers, Lactated)  1,000 mls @ 100 mls/hr IV ASDIRECTED ROGER


   Last Admin: 21 17:24 Dose:  100 mls/hr


   Documented by: 


Oxytocin/Lactated Ringer's (Pitocin In Lr 10 Units/1,000 Ml)  10 unit in 1,000 

mls @ 100 mls/hr IV .CONTINUOUS ROGER


   Last Infusion: 21 13:49 Dose:  250 mls/hr


   Documented by: 


Oxytocin/Lactated Ringer's (Pitocin In Lr 10 Units/1,000 Ml)  10 unit in 1,000 

mls @ 12 mls/hr IV TITRATE ROGER; Protocol


   Last Titration: 21 08:58 Dose:  17 munits/min, 102 mls/hr


   Documented by: 


Magnesium Sulfate (Magnesium Sulfate In Water 4 Gm/50 Ml) Confirm Administered 

Dose 50 mls @ as directed .ROUTE .STK-MED ONE


   Stop: 21 21:29


Magnesium Sulfate (Magnesium Sulfate In Water 40 Gm/1000 Ml) Confirm 

Administered Dose 40 gm in 1,000 mls @ as directed .ROUTE .ST-MED ONE


   Stop: 21 21:29


   Last Admin: 21 22:00 Dose:  50 mls/hr


   Documented by: 


Magnesium Sulfate 4 gm/ Premix  50 mls @ 400 mls/hr IV ONETIME ONE


   Stop: 21 22:23


   Last Admin: 21 21:50 Dose:  400 mls/hr


   Documented by: 


Magnesium Sulfate 2 gm/ Premix  50 mls @ 50 mls/hr IV ONETIME ONE


   Stop: 21 23:15


Lactated Ringer's (Ringers, Lactated)  1,000 mls @ 25 mls/hr IV ASDIRECTED ROGER


   Last Admin: 21 21:50 Dose:  25 mls/hr


   Documented by: 


Magnesium Sulfate (Magnesium Sulfate In Water 40 Gm/1000 Ml)  40 gm in 1,000 mls

@ 50 mls/hr IV ASDIRECTED ROGER


Sodium Chloride (Normal Saline)  1,000 mls @ 75 mls/hr IV ASDIRECTED ROGER


Sodium Chloride (Normal Saline) Confirm Administered Dose 1,000 mls @ as 

directed .ROUTE .ST-MED ONE


   Stop: 21 12:42


Sodium Chloride (Normal Saline) Confirm Administered Dose 500 mls @ as directed 

.ROUTE .[facility]-MED ONE


   Stop: 21 13:09


Cefoxitin Sodium 1 gm/ Premix  50 mls @ 100 mls/hr IV ONETIME ONE


   Stop: 21 19:19


   Last Admin: 21 19:06 Dose:  100 mls/hr


   Documented by: 


Labetalol HCl (Labetalol 100 Mg/20 Ml Mdv) Confirm Administered Dose 100 mg 

.ROUTE .STK-MED ONE


   Stop: 21 21:22


   Last Admin: 21 21:54 Dose:  40 mg


   Documented by: 


Lidocaine/Epinephrine (Lidocaine 1.5% With Epinephrine 1:200,000 5 Ml Amp)  5 ml

.ROUTE .STK-MED ONE


   Stop: 21 16:01


Misoprostol (Misoprostol 25 Mcg (1/4 Of 100 Mcg) Tab)  25 mcg VAG Q4H PRN


   PRN Reason: cervical ripening


   Last Admin: 21 13:29 Dose:  25 mcg


   Documented by: 


Misoprostol (Misoprostol 200 Mcg Tab)  600 mcg .ROUTE .STK-MED ONE


   Stop: 21 13:01


Misoprostol (Misoprostol 200 Mcg Tab)  600 mcg PO .ONE ONE


   Stop: 21 12:31


   Last Admin: 21 12:28 Dose:  600 mcg


   Documented by: 


Nalbuphine HCl (Nalbuphine 10 Mg/1 Ml Vial)  10 mg IVPUSH Q2H PRN


   PRN Reason: Pain


Ondansetron HCl (Ondansetron 4 Mg/2 Ml Sdv) Confirm Administered Dose 4 mg 

.ROUTE .STK-MED ONE


   Stop: 21 21:49


Sodium Chloride (Sodium Chloride 0.9% 10 Ml Syringe)  10 ml FLUSH ASDIRECTED PRN


   PRN Reason: Keep Vein Open


Tranexamic Acid (Tranexamic Acid 1,000 Mg/10 Ml Amp) Confirm Administered Dose 

1,000 mg .ROUTE .STK-MED ONE


   Stop: 21 12:30


Tranexamic Acid (Tranexamic Acid 1,000 Mg/10 Ml Amp)  1,000 mg IVPUSH .ONETIME 

ONE


   Stop: 21 12:46


   Last Admin: 21 12:32 Dose:  1,000 mg


   Documented by: 











- Infant Interaction


Support Person: 





- Postpartum Recovery Exam


Fundal Tone: Firm


Fundal Level: At Umbilicus


Fundal Placement: Midline


Lochia Amount: Scant


Lochia Color: Rubra/Red


Perineum Description: Intact, Minimal Bruising/Swelling


Episiotomy/Laceration: Approximated


Bladder Status: Indwelling Catheter in Place


Urinary Elimination: Indwelling Catheter





- Exam


General: Alert, Oriented


HEENT: Pupils Equal


Neck: Supple


Lungs: Clear to Auscultation, Normal Respiratory Effort


Cardiovascular: Regular Rate, Regular Rhythm


GI/Abdominal Exam: Normal Bowel Sounds, Soft, Non-Tender, No Organomegaly, No 

Distention


Extremities: Normal Inspection, Normal Range of Motion, Non-Tender, No Pedal 

Edema, Normal Capillary Refill


Skin: Warm, Dry


Wound/Incisions: Healing Well


Neurological: No New Focal Deficit


Psy/Mental Status: Alert, Normal Affect, Normal Mood





- Problem List Review


Problem List Initiated/Reviewed/Updated: Yes





- My Orders


Last 24 Hours: 


My Active Orders





21 16:06


Benzocaine/Menthol [Dermoplast Pain Relief Spray]   See Dose Instructions  TOP 

ASDIRECTED PRN 


witch hazeL [Tucks]   1 pad TOP ASDIRECTED PRN 


Heat Therapy [OM.PC] PRN 





21 16:06


Activity as Tolerated [RC] PER UNIT ROUTINE 


Vital Signs [RC] Q4HR 


Assess Lochia [WOMSER] Per Unit Routine 


Assess Uterine Involution [WOMSER] Per Unit Routine 


Breast Pump [WOMSER] Per Unit Routine 


Medication Administration Instruction [OM.PC] Routine 


Perineal Care [OM.PC] Per Unit Routine 


Sitz Bath [OM.PC] Per Unit Routine 





21 19:13


Acetaminophen [TylenoL]   650 mg PO Q6H PRN 





21 19:14


Acetaminophen/oxyCODONE [Percocet 325-5 MG]   1 tab PO Q6H PRN 





21 16:06


Heat Therapy [OM.PC] PRN 














- Assessment


Assessment:: 





PPD1 s/p postpartum hemorrhage with initially increased creatinine. Tired but 

doing well. Repeat labs pending.


Bakri removed this morning with minimal output and minimal bleeding after.


Urine output adequate and cleared.

## 2021-07-21 NOTE — [id number]
Post Anesthesia Note





- EVALUATION WITHIN 48HRS OF ANESTHETIC


Vital Signs in Normal Range: Yes


Patient Participated in Evaluation: Yes


Respiratory Function Stable: Yes


Airway Patent: Yes


Cardiovascular Function Stable: Yes


Hydration Status Stable: Yes


Pain Control Satisfactory: Yes


Nausea and Vomiting Control Satisfactory: Yes


Mental Status Recovered: Yes


Vital Signs: 


                                Last Vital Signs











Temp  98.8 F   07/21/21 13:32


 


Pulse  99   07/21/21 13:32


 


Resp  15   07/21/21 13:32


 


BP  146/86 H  07/21/21 13:32


 


Pulse Ox  100   07/21/21 13:32














- COMMENTS/OBSERVATIONS


Free Text/Narrative:: 





Visited with patient regarding epidural experience. Patient stated that it went 

well. Patient complaining of mild back discomfort at epidural site. Patient 

denying nausea, headache, ringing in ears, and dizziness. Reviewed signs and 

symptoms of a post-dural puncture headache, signs of infection, or if back pain 

increases. Encouraged patient to notify OB/Anesthesia if any of the above 

symptoms develop and so patient can be treated accordingly. Patient verbalized 

understanding. Patient and significant other did not verbalize any questions or 

concerns at this time. 





Sandra Mendez CRNA

## 2021-07-22 NOTE — PCM.DCSUM1
**Discharge Summary





- Hospital Course


Free Text/Narrative:: 





Stage I - Patient presented for induction of labor. Flores bulb. AROM. Pitocin. 

Elevated blood pressures requiring labetolol. Diagnosed with pre-ecclampsia. 

Magnesium initiated. Progressed to complete with overall reassuring fetal heart 

tones.





Stage II -  of viable male, weight 8#11 oz. 4/9 apgars at 1222. Head 

delivered in controlled manner over intact perineum. Body and shoulders followed

atraumatically. Cord clamped and cut and to warmer. Cord blood collected.





Stage III -  of intact placenta. Immediately vigorous bleeding per vagina. 

Bladder emptied with straight catheter. Bimanual massage revealed boggy uterus. 

Continued bleeding. Magnesium discontinued. Cytotec 600 mcg buccal given. TXA 

initiated. Vigourous bleeding continued. Decision made to obtain labs and 

emergency release of 2 units pRBC and obtain labs. Bakri placed in usual fash

ion. INstilled with 360. QBL 3000. Bleeding after bakri minimal. 


Diagnosis: Stroke: No





- Discharge Data


Discharge Date: 21


Discharge Disposition: Home, Self-Care 01


Condition: Good





- Referral to Home Health


Primary Care Physician: 


Betty Reed MD








- Discharge Diagnosis/Problem(s)


(1) 41 weeks gestation of pregnancy


SNOMED Code(s): 15204564


   ICD Code: Z3A.41 - 41 WEEKS GESTATION OF PREGNANCY   Status: Acute   Current 

Visit: Yes   





(2) Obesity affecting pregnancy


SNOMED Code(s): 267237371131, 803403154201


   ICD Code: O99.210 - OBESITY COMPLICATING PREGNANCY, UNSPECIFIED TRIMESTER   

Status: Acute   Current Visit: Yes   





(3) Vaginal delivery


SNOMED Code(s): 362810330


   ICD Code: O80 - ENCOUNTER FOR FULL-TERM UNCOMPLICATED DELIVERY   Status: 

Acute   Current Visit: Yes   





(4) Second degree perineal laceration during delivery


SNOMED Code(s): 4635878


   ICD Code: O70.1 - SECOND DEGREE PERINEAL LACERATION DURING DELIVERY   Status:

Acute   Current Visit: Yes   





(5) Severe preeclampsia


SNOMED Code(s): 88262159


   ICD Code: O14.10 - SEVERE PRE-ECLAMPSIA, UNSPECIFIED TRIMESTER   Status: 

Acute   Current Visit: Yes   





(6) Postpartum hemorrhage


SNOMED Code(s): 85315578


   ICD Code: O72.1 - OTHER IMMEDIATE POSTPARTUM HEMORRHAGE   Status: Acute   

Current Visit: Yes   





- Patient Summary/Data


Complications: Severe preeclampsia that was treated with labetalol during labor 

and postpartum hemorrhage requiring 2 units PRBCs after delivery


Consults: 


None


Hospital Course: 





Kelsey Jerry was admitted for induction of labor in the setting of late term

pregnancy.  On admission her cervix was dilated to 2 cm.  She was GBS 

negative.she had a 16 French Flores bulb catheter placed with speculum that was 

filled with 40 mL of sterile saline.  She received 1 dose of Cytotec vaginally 

for induction of labor.  She was noted to have several mild range blood 

pressures that persisted until the Flores catheter came out.  She had labs drawn 

that were overall normal except for urine protein/creatinine ratio that was 

elevated at 4.9.  She was started on pitocin for augmentation of labor.  She had

artificial rupture of membranes with clear fluid.  She had severe sustained 

blood pressures into the 160s to 170s/80s to 100s with one value that was at 145

diastolic.  She was treated with labetalol 20 mg IV and continue to have severe 

range blood pressures and then was treated with labetalol 40 mg IV.  She was 

started on magnesium sulfate IV for seizure prophylaxis.  She was given an 

epidural for anesthesia.  After she had placement of the epidural her blood 

pressures were in the normal to mild range values.  She progressed to complete a

nd began pushing.  





On 2021 she had a normal vaginal delivery of a live male infant at 12:22.  

Apgars of 4 and 9.  Weight of 3930 g (8 pounds 10.6 ounces).  After delivery she

had significant bleeding and her bladder was emptied.  The magnesium sulfate was

discontinued.  She was given 600 mcg of Cytotec buccally.  She was given 

tranexamic acid.  She had a Bakri balloon placed manually and was filled with 

360 mL of sterile saline.  She was given 2 units PRBCs and had labs drawn that 

were within normal limits.  





The remainder of her postpartum course was overall uneventful.  In the afternoon

of PPD #0 her creatinine was elevated to a value of 1.2.  In the morning of PPD 

#1 she had the Bakri balloon removed and she did not have significant bleeding 

at that time.  Her hematocrit was at 28.7 in the morning of PPD #1.  Her 

creatinine improved to 0.8 in the morning of PPD #1.  Her pain was well 

controlled and she had minimal lochia.  In the morning of PPD #1 she continued 

to have mild lightheadedness and dizziness with ambulation but this improved 

throughout the day and she was not having any significant lightheadedness or 

dizziness in the evening of PPD #1.  She was ambulating, tolerating a regular 

diet and voiding normally.  She was breast-feeding with minimal difficulty.  She

was afebrile and her hematocrit was 28.7 on morning of PPD #1.  She desired to 

be discharged home in the afternoon of PPD #2.  Her blood type is O+.








- Patient Instructions


Diet: Regular Diet as Tolerated


Activity: Apply Ice, As Tolerated


Activity, Other: Nothing in the vagina for 6 weeks


Driving: May Drive Today


Showering/Bathing: May Shower


Notify Provider of: Fever, Increased Pain, Swelling and Redness, Drainage, 

Nausea and/or Vomiting


Other/Special Instructions: Please contact your physician's office if you have 

heavy vaginal bleeding enough to soak a pad in less than an hour for several 

hours.  Monitor for any signs of an infection in the breasts with severe pain or

redness of the breast.  Please contact your physician's office if you have a 

severe headache that does not improve with Tylenol or ibuprofen, spots in your 

vision or severe pain in your upper abdomen.





- Discharge Plan


*PRESCRIPTION DRUG MONITORING PROGRAM REVIEWED*: Not Applicable


*COPY OF PRESCRIPTION DRUG MONITORING REPORT IN PATIENT [facility]ONA: Not Applicable


Prescriptions/Med Rec: 


Ibuprofen 600 mg PO Q6H PRN #60 tablet


 PRN Reason: Pain


Home Medications: 


                                    Home Meds





Acetaminophen/oxyCODONE [Percocet 325-5 MG] 1 tab PO Q6H PRN #10 tablet 16

[Rx]


Pnv No.95/Ferrous Fum/Folic AC [Prenatal Caplet] 1 tab-cap PO DAILY 21 

[History]


Acetaminophen [Tylenol] 650 mg PO Q6H PRN  tablet 21 [Rx]


Benzocaine/Menthol [Dermoplast Pain Relief Spray] 1 spray TOP ASDIRECTED PRN  

canister 21 [Rx]


Ibuprofen 600 mg PO Q6H PRN #60 tablet 21 [Rx]


witch hazeL [Tucks] 1 pad TOP ASDIRECTED PRN  pad 21 [Rx]








Patient Handouts:  Preeclampsia and Eclampsia, Care of a Perineal Tear, 

Postpartum Care After Vaginal Delivery


Referrals: 


Betty Reed MD [Primary Care Provider] -  (Follow-up in 1 week for 

routine postpartum check and blood pressure check or earlier as needed.)





- Discharge Summary/Plan Comment


DC Time >30 min.: No





- Patient Data


Vitals - Most Recent: 


                                Last Vital Signs











Temp  36.8 C   21 15:06


 


Pulse  91   21 15:06


 


Resp  15   21 15:06


 


BP  122/82   21 15:06


 


Pulse Ox  96   21 15:06











Weight - Most Recent: 117.934 kg


I&O - Last 24 hours: 


                                 Intake & Output











 21





 06:59 14:59 22:59


 


Intake Total   120


 


Balance   120











Med Orders - Current: 


                               Current Medications





Acetaminophen (Acetaminophen 325 Mg Tab)  650 mg PO Q6H PRN


   PRN Reason: Breakthrough Pain


   Last Admin: 21 15:05 Dose:  650 mg


   Documented by: 


Benzocaine/Menthol (Benzocaine/Menthol 20%-0.5% Spray 56 Gm Canister)  0 gm TOP 

ASDIRECTED PRN


   PRN Reason: Perineal Comfort Measure


   Last Admin: 21 16:59 Dose:  1 ea


   Documented by: 


Oxycodone/Acetaminophen (Acetaminophen/Oxycodone 325-5 Mg Tab)  1 tab PO Q6H PRN


   PRN Reason: Pain


Witch Hazel (Witch Hazel Medicated Pads 40/Jar)  1 pad TOP ASDIRECTED PRN


   PRN Reason: Perineal Comfort Measure


   Last Admin: 21 16:59 Dose:  1 ea


   Documented by: 





Discontinued Medications





Acetaminophen (Acetaminophen 325 Mg Tab)  650 mg PO Q6H PRN


   PRN Reason: Pain (Mild 1-3) and fever


   Last Admin: 21 14:37 Dose:  650 mg


   Documented by: 


Bupivacaine HCl (Bupivacaine 0.25% 10 Ml Sdv)  10 ml .ROUTE .STK-MED ONE


   Stop: 21 16:01


Calcium Carbonate/Glycine (Calcium Carbonate 500 Mg Tab.Chew)  1,000 mg PO Q2HR 

PRN


   PRN Reason: Indigestion


   Last Admin: 21 04:11 Dose:  1,000 mg


   Documented by: 


Calcium Gluconate (Calcium Gluconate 10% 1 Gm/10 Ml Sdv)  1 gm IV ASDIRECTED PRN


   PRN Reason: respiratory distress


Dexmedetomidine HCl (Dexmedetomidine 200 Mcg/2 Ml Sdv) Confirm Administered Dose

200 mcg .ROUTE .STK-MED ONE


   Stop: 21 07:19


Diphenhydramine HCl (Diphenhydramine 50 Mg/Ml Sdv)  25 mg IVPUSH Q6H PRN


   PRN Reason: pruritis


Ephedrine Sulfate (Ephedrine 50 Mg/Ml Sdv)  5 mg IVPUSH ASDIRECTED PRN


   PRN Reason: Hypotension


Fentanyl (Fentanyl 100 Mcg/2 Ml Sdv)  100 mcg EPIDUR Q3H PRN


   PRN Reason: Pain


   Last Admin: 21 22:01 Dose:  100 mcg


   Documented by: 


Fentanyl (Fentanyl 100 Mcg/2 Ml Sdv) Confirm Administered Dose 100 mcg .ROUTE 

.STK-MED ONE


   Stop: 21 07:23


Fentanyl/Bupivacaine HCl (Bupivacaine/Fentanyl/Ns 100 Ml Bag)  100 ml EPIDUR 

ASDIRECTED PRN


   PRN Reason: Pain


   Last Admin: 21 05:12 Dose:  100 ml


   Documented by: 


Lactated Ringer's (Ringers, Lactated)  1,000 mls @ 100 mls/hr IV ASDIRECTED ROGER


   Last Admin: 21 17:24 Dose:  100 mls/hr


   Documented by: 


Oxytocin/Lactated Ringer's (Pitocin In Lr 10 Units/1,000 Ml)  10 unit in 1,000 

mls @ 100 mls/hr IV .CONTINUOUS ROGER


   Last Infusion: 21 13:49 Dose:  250 mls/hr


   Documented by: 


Oxytocin/Lactated Ringer's (Pitocin In Lr 10 Units/1,000 Ml)  10 unit in 1,000 

mls @ 12 mls/hr IV TITRATE ROGER; Protocol


   Last Titration: 21 08:58 Dose:  17 munits/min, 102 mls/hr


   Documented by: 


Magnesium Sulfate (Magnesium Sulfate In Water 4 Gm/50 Ml) Confirm Administered 

Dose 50 mls @ as directed .ROUTE .STK-MED ONE


   Stop: 21 21:29


Magnesium Sulfate (Magnesium Sulfate In Water 40 Gm/1000 Ml) Confirm 

Administered Dose 40 gm in 1,000 mls @ as directed .ROUTE .STOvermediaCast-MED ONE


   Stop: 21 21:29


   Last Admin: 21 22:00 Dose:  50 mls/hr


   Documented by: 


Magnesium Sulfate 4 gm/ Premix  50 mls @ 400 mls/hr IV ONETIME ONE


   Stop: 21 22:23


   Last Admin: 21 21:50 Dose:  400 mls/hr


   Documented by: 


Magnesium Sulfate 2 gm/ Premix  50 mls @ 50 mls/hr IV ONETIME ONE


   Stop: 21 23:15


Lactated Ringer's (Ringers, Lactated)  1,000 mls @ 25 mls/hr IV ASDIRECTED ROGER


   Last Admin: 21 21:50 Dose:  25 mls/hr


   Documented by: 


Magnesium Sulfate (Magnesium Sulfate In Water 40 Gm/1000 Ml)  40 gm in 1,000 mls

@ 50 mls/hr IV ASDIRECTED ROGER


Sodium Chloride (Normal Saline)  1,000 mls @ 75 mls/hr IV ASDIRECTED ROGER


Sodium Chloride (Normal Saline) Confirm Administered Dose 1,000 mls @ as 

directed .ROUTE .STK-MED ONE


   Stop: 21 12:42


Sodium Chloride (Normal Saline) Confirm Administered Dose 500 mls @ as directed 

.ROUTE .STK-MED ONE


   Stop: 21 13:09


Cefoxitin Sodium 1 gm/ Premix  50 mls @ 100 mls/hr IV ONETIME ONE


   Stop: 21 19:19


   Last Admin: 21 19:06 Dose:  100 mls/hr


   Documented by: 


Labetalol HCl (Labetalol 100 Mg/20 Ml Mdv) Confirm Administered Dose 100 mg 

.ROUTE .STK-MED ONE


   Stop: 21 21:22


   Last Admin: 21 21:54 Dose:  40 mg


   Documented by: 


Lidocaine/Epinephrine (Lidocaine 1.5% With Epinephrine 1:200,000 5 Ml Amp)  5 ml

.ROUTE .STK-MED ONE


   Stop: 21 16:01


Misoprostol (Misoprostol 25 Mcg (1/4 Of 100 Mcg) Tab)  25 mcg VAG Q4H PRN


   PRN Reason: cervical ripening


   Last Admin: 21 13:29 Dose:  25 mcg


   Documented by: 


Misoprostol (Misoprostol 200 Mcg Tab)  600 mcg .ROUTE .STK-MED ONE


   Stop: 21 13:01


Misoprostol (Misoprostol 200 Mcg Tab)  600 mcg PO .ONE ONE


   Stop: 21 12:31


   Last Admin: 21 12:28 Dose:  600 mcg


   Documented by: 


Nalbuphine HCl (Nalbuphine 10 Mg/1 Ml Vial)  10 mg IVPUSH Q2H PRN


   PRN Reason: Pain


Ondansetron HCl (Ondansetron 4 Mg/2 Ml Sdv) Confirm Administered Dose 4 mg 

.ROUTE .STK-MED ONE


   Stop: 21 21:49


Sodium Chloride (Sodium Chloride 0.9% 10 Ml Syringe)  10 ml FLUSH ASDIRECTED PRN


   PRN Reason: Keep Vein Open


Tranexamic Acid (Tranexamic Acid 1,000 Mg/10 Ml Amp) Confirm Administered Dose 

1,000 mg .ROUTE .STK-MED ONE


   Stop: 21 12:30


Tranexamic Acid (Tranexamic Acid 1,000 Mg/10 Ml Amp)  1,000 mg IVPUSH .ONETIME 

ONE


   Stop: 21 12:46


   Last Admin: 21 12:32 Dose:  1,000 mg


   Documented by:

## 2021-07-22 NOTE — PCM.SN.2
- Free Text/Narrative


Note: 





Post Partum Progress Note


PPD #2





Subjective:


Doing well overall.  Ambulating without difficulty.  Reports that she has been 

able to ambulate overnight and this morning without any lightheadedness or 

dizziness.  Lochia minimal at this time and is changing a pad every couple of 

hours..  Voiding without difficulty.  Tolerating regular diet without nausea or 

vomiting.  Pain controlled with oral medications.  Breast-feeding with minimal 

difficulty.  Denies any headaches, vision changes or epigastric pain.





Objective: 


Vitals:


                               Vital Signs - 24 hr











  21





  13:32 15:47 18:50


 


Temperature 37.1 C 38.0 C 


 


Temperature [   37.1 C





Temporal]   


 


Pulse, 99 96 





Peripheral   


 


Respiratory 15 22 H 





Rate   


 


Blood Pressure 146/86 H 133/80 


 


O2 Sat by Pulse 100 98 





Oximetry   














  21





  21:41 05:55


 


Temperature 36.8 C 36.7 C


 


Temperature [  





Temporal]  


 


Pulse, 87 78





Peripheral  


 


Respiratory 16 16





Rate  


 


Blood Pressure 130/79 129/89


 


O2 Sat by Pulse 97 98





Oximetry  














Physical Exam


General: Alert and oriented, no acute distress


Lungs: Clear to auscultation bilaterally


Heart: Regular rate and rhythm


Abdomen: Soft, minimal appropriate tenderness, non-distended, fundus midline, 

nontender, and at the umbilicus


Extremities: 2+ edema in bilateral lower extremities to knees, no calf 

tenderness bilaterally








ASSESSMENT: 


25-year-old female -0-0-1 s/p normal vaginal delivery PPD #2, complicated 

by postpartum hemorrhage of 3 L with 2 units PRBC blood transfusion, 

preeclampsia with severe features and obesity in pregnancy





PLAN: 


Doing well at this time


Patient without any concerning symptoms of acute blood loss anemia such as 

lightheadedness, dizziness or shortness of breath.  She has been able to 

ambulate without difficulty.  Patient's hemoglobin on PPD #1 was 9.4 after 

receiving 2 units PRBCs.  We will continue to monitor throughout the day and 

ensure that she is doing well.


Breast-feeding with minimal difficulty. Assist as needed


Lochia minimal. Continue to monitor for appropriate lochia.


Continue routine postpartum care


Patient continues to have occasional mild range blood pressures.  No blood 

pressures in a range that would need to be treated at this time.  


Continue to monitor for any signs or symptoms of severe preeclampsia that would 

necessitate treatment.


Anticipate discharge home today if she continues to do well with ambulation and 

there is no evidence of anemia symptoms








Nelson Jennings MD


8:33 AM


2021

## 2023-04-26 ENCOUNTER — HOSPITAL ENCOUNTER (INPATIENT)
Dept: HOSPITAL 41 - JD.OB | Age: 28
LOS: 2 days | Discharge: HOME | DRG: 560 | End: 2023-04-28
Attending: OBSTETRICS & GYNECOLOGY | Admitting: OBSTETRICS & GYNECOLOGY
Payer: COMMERCIAL

## 2023-04-26 DIAGNOSIS — Z3A.39: ICD-10-CM

## 2023-04-26 DIAGNOSIS — Z88.2: ICD-10-CM

## 2023-04-26 DIAGNOSIS — Z79.82: ICD-10-CM

## 2023-04-26 PROCEDURE — 3E033VJ INTRODUCTION OF OTHER HORMONE INTO PERIPHERAL VEIN, PERCUTANEOUS APPROACH: ICD-10-PCS | Performed by: OBSTETRICS & GYNECOLOGY

## 2023-04-26 PROCEDURE — 10907ZC DRAINAGE OF AMNIOTIC FLUID, THERAPEUTIC FROM PRODUCTS OF CONCEPTION, VIA NATURAL OR ARTIFICIAL OPENING: ICD-10-PCS | Performed by: OBSTETRICS & GYNECOLOGY

## 2023-04-26 PROCEDURE — 3E0P7VZ INTRODUCTION OF HORMONE INTO FEMALE REPRODUCTIVE, VIA NATURAL OR ARTIFICIAL OPENING: ICD-10-PCS | Performed by: OBSTETRICS & GYNECOLOGY

## 2023-04-26 PROCEDURE — 3E0R3BZ INTRODUCTION OF ANESTHETIC AGENT INTO SPINAL CANAL, PERCUTANEOUS APPROACH: ICD-10-PCS | Performed by: OBSTETRICS & GYNECOLOGY

## 2023-04-26 PROCEDURE — 10H07YZ INSERTION OF OTHER DEVICE INTO PRODUCTS OF CONCEPTION, VIA NATURAL OR ARTIFICIAL OPENING: ICD-10-PCS | Performed by: OBSTETRICS & GYNECOLOGY

## 2023-04-26 PROCEDURE — 00HU33Z INSERTION OF INFUSION DEVICE INTO SPINAL CANAL, PERCUTANEOUS APPROACH: ICD-10-PCS | Performed by: OBSTETRICS & GYNECOLOGY

## 2023-04-28 VITALS — SYSTOLIC BLOOD PRESSURE: 115 MMHG | HEART RATE: 85 BPM | DIASTOLIC BLOOD PRESSURE: 54 MMHG
